# Patient Record
Sex: FEMALE | Race: OTHER | Employment: UNEMPLOYED | URBAN - METROPOLITAN AREA
[De-identification: names, ages, dates, MRNs, and addresses within clinical notes are randomized per-mention and may not be internally consistent; named-entity substitution may affect disease eponyms.]

---

## 2023-12-27 ENCOUNTER — HOSPITAL ENCOUNTER (EMERGENCY)
Age: 23
Discharge: HOME | End: 2023-12-27

## 2023-12-27 ENCOUNTER — APPOINTMENT (OUTPATIENT)
Dept: GENERAL RADIOLOGY | Age: 23
End: 2023-12-27

## 2023-12-27 VITALS
TEMPERATURE: 98.8 F | SYSTOLIC BLOOD PRESSURE: 109 MMHG | RESPIRATION RATE: 16 BRPM | WEIGHT: 132.28 LBS | OXYGEN SATURATION: 99 % | HEART RATE: 107 BPM | HEIGHT: 64 IN | BODY MASS INDEX: 22.58 KG/M2 | DIASTOLIC BLOOD PRESSURE: 76 MMHG

## 2023-12-27 DIAGNOSIS — J10.1 INFLUENZA B: Primary | ICD-10-CM

## 2023-12-27 LAB
FLUAV RNA SPEC QL NAA+PROBE: NOT DETECTED
FLUBV RNA SPEC QL NAA+PROBE: DETECTED
SARS-COV-2 RDRP RESP QL NAA+PROBE: NOT DETECTED
SOURCE: NORMAL

## 2023-12-27 PROCEDURE — 99284 EMERGENCY DEPT VISIT MOD MDM: CPT

## 2023-12-27 PROCEDURE — 6370000000 HC RX 637 (ALT 250 FOR IP): Performed by: PHYSICIAN ASSISTANT

## 2023-12-27 PROCEDURE — 71046 X-RAY EXAM CHEST 2 VIEWS: CPT

## 2023-12-27 PROCEDURE — 87502 INFLUENZA DNA AMP PROBE: CPT

## 2023-12-27 PROCEDURE — 87635 SARS-COV-2 COVID-19 AMP PRB: CPT

## 2023-12-27 RX ORDER — IBUPROFEN 600 MG/1
600 TABLET ORAL
Status: COMPLETED | OUTPATIENT
Start: 2023-12-27 | End: 2023-12-27

## 2023-12-27 RX ADMIN — IBUPROFEN 600 MG: 600 TABLET, FILM COATED ORAL at 18:05

## 2023-12-27 NOTE — ED PROVIDER NOTES
Emergency Department Provider Note       PCP: No primary care provider on file. Age: 20 y.o. Sex: female     DISPOSITION Decision To Discharge 12/27/2023 06:10:11 PM       ICD-10-CM    1. Influenza B  J10.1           Medical Decision Making     Complexity of Problems Addressed:  1 or more acute illnesses that pose a threat to life or bodily function. Data Reviewed and Analyzed:  I independently ordered and reviewed each unique test.  I reviewed external records: ED visit note from an outside group. The patients assessment required an independent historian: Friend. The reason they were needed is . Discussion of management or test interpretation. Patient appears to have a viral infection today. Her influenza B is positive and vital signs are stable, CXR is negative for acute process and exam is unremarkable. She was encouraged on symptomatic care, to drink plenty of fluids, rest, follow-up with a primary care physician and return to the emergency room if worsening. Use medication as directed, if OTC or prescription meds were given. All of her questions were answered. She is stable for discharge and ambulatory out of the ED at this time. Risk of Complications and/or Morbidity of Patient Management:  Shared medical decision making was utilized in creating the patients health plan today. Considerations: The following items were considered but not ordered: Further evaluation. History       Patient is here with fever, rhinorrhea, congestion, body aches, sore throat, cough and not feeling well for 5 days. She did have nausea, vomiting and diarrhea that has resolved. No chest pain, shortness of breath, abdominal pain, trouble with urination or bowel movements, dizziness, weakness, dyspnea on exertion, swelling/tingling or weakness to arms and legs. She ambulated to the room without difficulty and is well hydrated. LMP 12/5/23. Friend is sick with same.     The history is provided by the

## 2023-12-27 NOTE — DISCHARGE INSTRUCTIONS
You have been diagnosed with a viral syndrome. The influenza B was positive. This is an infection caused by a virus, therefor an antibiotic is not indicated. The best treatment for a viral illness is symptomatic- this includes hydration, rest and pain relief with Tylenol/Motrin. You may also wish to take an OTC cold medicine of your choice, however, you should only take one and follow the directions carefully. Follow up with your PCP for recheck. Return to the ER if you develop worsening symptoms.